# Patient Record
(demographics unavailable — no encounter records)

---

## 2025-03-25 NOTE — HISTORY OF PRESENT ILLNESS
[de-identified] : HÉCTOR BAZZI is a 74 year old male presenting with approximately 1 month of atraumatic right sided anterior knee swelling and mild discomfort.  He is also noticing some mild discomfort in the contralateral knee now as well.  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  Denies any trauma or accident that caused the swelling.  He states 1 month ago the swelling was even larger and he initially saw a dermatologist who recommended he see a sports medicine doctor orthopedic.  He said since then the swelling has gone down moderately but is still present and bothersome and would like to discuss treatment options moving forward.  Denies any redness warmth erythema or systemic symptoms.

## 2025-03-25 NOTE — DISCUSSION/SUMMARY
[de-identified] : JULIAN MILLER is a 60 year old male presenting with 1 month history of atraumatic prepatellar bursitis.  Plan: 1.  Discussed conservative measures with compression and Ace wrap was provided and encouraged elevation at home as well 2.  Discussed use of topical Voltaren gel as well as oral diclofenac twice daily as needed 3.  Patient will follow-up in 1 month if still symptomatic and bursitis is not improving can consider aspiration with steroid injection.  Patient verbalizes understanding and agrees with treatment plan.

## 2025-03-25 NOTE — PHYSICAL EXAM
[de-identified] : Knee (right)   Inspection  Skin: normal  Effusion: normal  Bursa swelling: Moderate, prepatellar bursa  Palpation  Tenderness: Mild Location: Prepatellar bursa  Crepitus: none.  Defect: none.  Popliteal fullness: negative.   Flexion  Active ROM: normal  Passive ROM: normal    Extension  Normal     Straight Leg Raise- can perform  Motor strength  Flexion: 5/5  Extension: 5/5   Sensory index  Normal.   ACL/PCL tests  Lachman test: stable  Anterior drawer: stable  Posterior drawer: stable   MCL/LCL tests  MCL laxity: stable  LCL laxity: stable   Patellofemoral tests  Patellar grind test: negative  Patellar apprehension: negative   Meniscal tests  Amish's test: normal  Thessalys: Normal  [de-identified] : XR of bilateral knees Date: 3/25/2025   Views: 4 views each Performed at [facility]: Yes Impression: No fracture dislocation or malalignment.  No significant osteoarthritis.  These images were personally reviewed with original findings documented as above.

## 2025-05-01 NOTE — DISCUSSION/SUMMARY
[de-identified] : JULIAN MILLER is a 60 year old male presenting with 1 month history of atraumatic prepatellar bursitis.  Patient's prepatellar bursitis has been resolving on its own with compression and elevation.  Pain is minimal at this time.  Plan: 1.  Patient will continue compression elevation as needed 2.  Patient will follow-up as needed can consider aspiration if swelling worsens or persist.

## 2025-05-01 NOTE — DISCUSSION/SUMMARY
[de-identified] : JULIAN MILLER is a 60 year old male presenting with 1 month history of atraumatic prepatellar bursitis.  Patient's prepatellar bursitis has been resolving on its own with compression and elevation.  Pain is minimal at this time.  Plan: 1.  Patient will continue compression elevation as needed 2.  Patient will follow-up as needed can consider aspiration if swelling worsens or persist.

## 2025-05-01 NOTE — PHYSICAL EXAM
[de-identified] : Knee (right)   Inspection  Skin: normal  Effusion: normal  Bursa swelling: Mild-moderate, prepatellar bursa  Palpation  Tenderness: None Location: Prepatellar bursa  Crepitus: none.  Defect: none.  Popliteal fullness: negative.   Flexion  Active ROM: normal  Passive ROM: normal    Extension  Normal     Straight Leg Raise- can perform  Motor strength  Flexion: 5/5  Extension: 5/5   Sensory index  Normal.   ACL/PCL tests  Lachman test: stable  Anterior drawer: stable  Posterior drawer: stable   MCL/LCL tests  MCL laxity: stable  LCL laxity: stable   Patellofemoral tests  Patellar grind test: negative  Patellar apprehension: negative   Meniscal tests  Amish's test: normal  Thessalys: Normal

## 2025-05-01 NOTE — HISTORY OF PRESENT ILLNESS
[de-identified] : HÉCTOR BAZZI is a 74 year old male presenting with approximately 1 month of atraumatic right sided anterior knee swelling and mild discomfort.  He is also noticing some mild discomfort in the contralateral knee now as well.  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  Denies any trauma or accident that caused the swelling.  He states 1 month ago the swelling was even larger and he initially saw a dermatologist who recommended he see a sports medicine doctor orthopedic.  He said since then the swelling has gone down moderately but is still present and bothersome and would like to discuss treatment options moving forward.  Denies any redness warmth erythema or systemic symptoms.  Interval history: Patient states his anterior knee swelling has gone down significantly still remains mild to moderately but is much improved and is continuing to go down.  States the pain is minimal and almost gone.

## 2025-05-01 NOTE — PHYSICAL EXAM
[de-identified] : Knee (right)   Inspection  Skin: normal  Effusion: normal  Bursa swelling: Mild-moderate, prepatellar bursa  Palpation  Tenderness: None Location: Prepatellar bursa  Crepitus: none.  Defect: none.  Popliteal fullness: negative.   Flexion  Active ROM: normal  Passive ROM: normal    Extension  Normal     Straight Leg Raise- can perform  Motor strength  Flexion: 5/5  Extension: 5/5   Sensory index  Normal.   ACL/PCL tests  Lachman test: stable  Anterior drawer: stable  Posterior drawer: stable   MCL/LCL tests  MCL laxity: stable  LCL laxity: stable   Patellofemoral tests  Patellar grind test: negative  Patellar apprehension: negative   Meniscal tests  Amish's test: normal  Thessalys: Normal

## 2025-05-01 NOTE — HISTORY OF PRESENT ILLNESS
[de-identified] : HÉCTOR BAZZI is a 74 year old male presenting with approximately 1 month of atraumatic right sided anterior knee swelling and mild discomfort.  He is also noticing some mild discomfort in the contralateral knee now as well.  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  Denies any trauma or accident that caused the swelling.  He states 1 month ago the swelling was even larger and he initially saw a dermatologist who recommended he see a sports medicine doctor orthopedic.  He said since then the swelling has gone down moderately but is still present and bothersome and would like to discuss treatment options moving forward.  Denies any redness warmth erythema or systemic symptoms.  Interval history: Patient states his anterior knee swelling has gone down significantly still remains mild to moderately but is much improved and is continuing to go down.  States the pain is minimal and almost gone.